# Patient Record
Sex: FEMALE | Race: WHITE | Employment: PART TIME | URBAN - NONMETROPOLITAN AREA
[De-identification: names, ages, dates, MRNs, and addresses within clinical notes are randomized per-mention and may not be internally consistent; named-entity substitution may affect disease eponyms.]

---

## 2020-01-14 RX ORDER — PANTOPRAZOLE SODIUM 40 MG/1
40 TABLET, DELAYED RELEASE ORAL DAILY PRN
COMMUNITY
End: 2020-02-13

## 2020-01-14 RX ORDER — HYDROCHLOROTHIAZIDE 12.5 MG/1
12.5 CAPSULE, GELATIN COATED ORAL DAILY
COMMUNITY

## 2020-01-14 RX ORDER — VILAZODONE HYDROCHLORIDE 10 MG/1
10 TABLET ORAL DAILY
COMMUNITY

## 2020-01-14 RX ORDER — ALBUTEROL SULFATE 90 UG/1
2 AEROSOL, METERED RESPIRATORY (INHALATION) EVERY 4 HOURS PRN
COMMUNITY

## 2020-01-14 RX ORDER — PREDNISONE 10 MG/1
10 TABLET ORAL DAILY
COMMUNITY
End: 2020-02-13

## 2020-01-14 RX ORDER — ALPRAZOLAM 0.5 MG/1
0.5 TABLET, ORALLY DISINTEGRATING ORAL 4 TIMES DAILY PRN
COMMUNITY

## 2020-02-13 ENCOUNTER — OFFICE VISIT (OUTPATIENT)
Dept: CARDIOLOGY | Age: 55
End: 2020-02-13
Payer: COMMERCIAL

## 2020-02-13 VITALS
HEIGHT: 63 IN | BODY MASS INDEX: 32.25 KG/M2 | SYSTOLIC BLOOD PRESSURE: 138 MMHG | HEART RATE: 54 BPM | DIASTOLIC BLOOD PRESSURE: 82 MMHG | WEIGHT: 182 LBS

## 2020-02-13 PROCEDURE — 0296T PR EXT ECG > 48HR TO 21 DAY RCRD W/CONECT INTL RCRD: CPT | Performed by: NURSE PRACTITIONER

## 2020-02-13 PROCEDURE — 99204 OFFICE O/P NEW MOD 45 MIN: CPT | Performed by: NURSE PRACTITIONER

## 2020-02-13 PROCEDURE — 93000 ELECTROCARDIOGRAM COMPLETE: CPT | Performed by: NURSE PRACTITIONER

## 2020-02-13 RX ORDER — PREDNISONE 10 MG/1
10 TABLET ORAL PRN
COMMUNITY

## 2020-02-13 RX ORDER — PANTOPRAZOLE SODIUM 40 MG/1
40 TABLET, DELAYED RELEASE ORAL DAILY
COMMUNITY

## 2020-02-13 RX ORDER — IBUPROFEN 200 MG
200 TABLET ORAL DAILY
COMMUNITY

## 2020-02-13 ASSESSMENT — ENCOUNTER SYMPTOMS
VOMITING: 0
BLOOD IN STOOL: 0
SHORTNESS OF BREATH: 1
FACIAL SWELLING: 0
COLOR CHANGE: 0
ABDOMINAL DISTENTION: 0
TROUBLE SWALLOWING: 0
WHEEZING: 0
STRIDOR: 0
ABDOMINAL PAIN: 0
COUGH: 0
EYE REDNESS: 0
EYE DISCHARGE: 0
NAUSEA: 0

## 2020-02-13 NOTE — PATIENT INSTRUCTIONS
valves and heart function. You may eat and take any medicines before the exam.     If you need to change your appointment, please call outpatient scheduling at [phone]. [place] at the [facility] [address] [facility] located on the first floor of [place] [address] through hospital main entrance and turn immediately to your left. Patient contact number:  [phone] (home)      Date/Arrival Time:      Stress Echocardiogram      This records the heart's activity during a cardiac stress test.  A stress echocardiogram is a very effective, noninvasive test that can help determine whether you have blockages in your coronary arteries. The exam takes approximately thirty minutes. To help ensure accurate results, patients should take the following steps in preparation for a stress echocardiogram:   Refrain from strenuous activity for 12 hours before the test.   Do not eat, drink, or smoke for two hours prior to the test.  Unless instructed otherwise by your physician, you should continue to take prescribed medications. Wear loose, comfortable clothing and walking shoes. If you need to change this appointment, please call [phone] to reschedule. 14-day monitor (ZIO Patch)      ZIO Patch  The ZIO® Patch is a new form of ambulatory cardiac monitoring described as a wearable patch. The Arti Malia is unique compared to traditional Holter monitors as the monitoring device has no leads, no wires and no batteries. The Arti Malia weighs just a few ounces that is a peel and stick device that is worn for an extended monitoring period of up to 14 days. Even though the device is worn for a longer duration than a Holter monitor, the ZIO Patch is said to be preferred by nearly 80% of patients as compared to a traditional 24 Holter monitor. Please allow 8 to 10 days for results, once you have mailed off your device.     Features of the ZIO Patch:  Arguably the smallest ambulatory cardiac monitor   Light weight   No lead wires   Single channel ECG recording   No batteries   Superior patient compliance with a water resistant   Up to 14 days of continuous ECG recording

## 2020-02-13 NOTE — PROGRESS NOTES
[address] [place] [address] Cardiology  [address]  [id number]  Phone: [phone]  Fax: [phone]    OFFICE VISIT:  2020    Quirino Chambers - : 1965    Reason For Visit:  Katina Piedra is a 48 y.o. female who is here for New Patient (elevated BP,jaw pain both sides,chest pain may be in joints)      HPI    Ms. Bethany Murphy is a 48 y.o. female with history of  hypertension, former nicotine dependence,  a family history of CAD, and abnormal Holter monitor who presents with the chief complaint of chest pain and heart \" flip-flopping. \"    Patient states around the holidays she noticed chest tightness in the center of her chest.  This occurs for minutes and spontaneously resolves. It can occur when she is awake and has also woken her up during the night. Occasionally it is associated with shortness of breath and a feeling as her heart is flip flopping. There are no aggravating factors. She does state that during this time her blood pressure was running 190/100 at home. She states that she feels first noticed her heart \"flopping\"  and wore a Holter monitor. She had accompanying dizziness. She states this occurred again in . She has started a diet since  and states she has lost some weight and has been feeling better since this time. Patient does have a history of [facility] [place] spotted fever. This was diagnosed 2018. Katina Piedra has no syncope. No numbness or weakness to suggest cerebrovascular accident or transient ischemic attack. she denies signs of bleeding. Reports no edema. She denies orthopnea. She reports paroxysmal nocturnal dyspnea approximately once a month, but states it could be her anxiety. she is sleeping on 1 pillow at night. she has been compliant with her medications. her BP has been controlled at home. PCP follows lipids and labs. EMMANUELLE Turner CNP is PCP. Quirino Chambers has the following history as recorded in [facility]:     There are no Negative for leg swelling. Gastrointestinal: Negative for abdominal distention, abdominal pain, blood in stool, nausea and vomiting. Endocrine: Negative for cold intolerance and heat intolerance. Genitourinary: Negative for dysuria and hematuria. Musculoskeletal: Negative for gait problem. Skin: Negative for color change, pallor and rash. Neurological: Positive for dizziness. Negative for syncope, facial asymmetry and light-headedness. Hematological: Does not bruise/bleed easily. Psychiatric/Behavioral: Negative for behavioral problems and confusion. All other systems reviewed and are negative. Objective  Vital Signs - /82   Pulse 54   Ht 5' 3\" (1.6 m)   Wt 182 lb (82.6 kg)   BMI 32.24 kg/m²   Physical Exam  Vitals signs and nursing note reviewed. Constitutional:       Appearance: She is well-developed. She is obese. HENT:      Head: Normocephalic and atraumatic. Right Ear: External ear normal.      Left Ear: External ear normal.      Nose: Nose normal.   Eyes:      General:         Right eye: No discharge. Left eye: No discharge. Pupils: Pupils are equal, round, and reactive to light. Neck:      Musculoskeletal: Normal range of motion. No edema. Vascular: No carotid bruit or JVD. Trachea: No tracheal deviation. Cardiovascular:      Rate and Rhythm: Regular rhythm. Bradycardia present. Heart sounds: Murmur (1/6) present. No friction rub. No gallop. Pulmonary:      Effort: Pulmonary effort is normal. No respiratory distress. Breath sounds: No wheezing, rhonchi or rales. Abdominal:      General: Bowel sounds are normal. There is no distension. Palpations: Abdomen is soft. Tenderness: There is no abdominal tenderness. Musculoskeletal: Normal range of motion. Skin:     General: Skin is warm and dry. Capillary Refill: Capillary refill takes less than 2 seconds. Findings: No rash.    Neurological:      Mental Status: She is alert and oriented to person, place, and time. Psychiatric:         Behavior: Behavior normal.         Judgment: Judgment normal.         Cardiac data:    ECG 02/13/20  Sinus bradycardia, 54 bpm    QTc 0.410 ms        Assessment, Recommendations, & Plan:  48 y.o. female with      Diagnosis Orders   1. Chest pain, unspecified type  EKG 12 lead    MD EXT ECG > 48HR TO 21 DAY RCRD W/CONECT INTL RCRD (Zio Recording)    Echo stress test    Echo 2D w doppler w color complete   2. Holter monitor, abnormal  MD EXT ECG > 48HR TO 21 DAY RCRD W/CONECT INTL RCRD (Zio Recording)    Echo stress test    Echo 2D w doppler w color complete   3. Dyspnea on exertion  MD EXT ECG > 48HR TO 21 DAY RCRD W/CONECT INTL RCRD (Zio Recording)    Echo stress test    Echo 2D w doppler w color complete       1. Chest pain-with continued episodes of chest pain and abnormal Holter readings would recommend 2D echo and echo stress test.    2.  Abnormal Holter monitor-reviewed from 2013 and 2015 appears to have several runs of nonsustained V. tach. Will order 14-day event monitor since it has been 5 years since assessed. 3.  Dyspnea on exertion-this occurs with and without chest discomfort. Will order 2D echo to assess. If 2D echo normal would recommend sleep apnea study for PND. 4.  Hypertension-patient is on HCTZ. Blood pressure today 138/82. Recommended patient keeping a blood pressure log. Will assess at follow-up appointment may need ACE/ARB.        Orders Placed This Encounter   Procedures    EKG 12 lead     Order Specific Question:   Reason for Exam?     Answer:   Chest pain    Echo stress test     Standing Status:   Future     Standing Expiration Date:   2/13/2021     Order Specific Question:   Reason for exam:     Answer:   chest pain, sob, nsvt    Echo 2D w doppler w color complete     Standing Status:   Future     Standing Expiration Date:   2/13/2021     Order Specific Question:   Reason for exam:     Answer:   cp, sob, nsvt    TX EXT ECG > 48HR TO 21 DAY RCRD W/CONECT INTL RCRD (Zio Recording)     Return in about 4 weeks (around 3/12/2020). Call with any questionsor concerns  Follow up with EMMANUELLE Ferguson - CNP for non cardiac problems  Report any new problems  Cardiovascular Fitness-Exercise as tolerated. Strive for 15 minutes of exercise most days of the week. Cardiac / HealthyDiet  Continue current medications as directed  Continue plan of treatment  It is always recommended that you bring your medicationsbottles with you to each visit - this is for your safety! Please do not hesitate to contact me for any questions or concerns. Sincerely yours,    EMMANUELLE Palumbo    This dictation was generated by voice recognition computer software. Although all attempts are made to edit dictation for accuracy, there may be errors in the transcription that are not intended.

## 2020-03-06 ENCOUNTER — HOSPITAL ENCOUNTER (OUTPATIENT)
Dept: NON INVASIVE DIAGNOSTICS | Age: 55
Discharge: HOME | End: 2020-03-06
Payer: COMMERCIAL

## 2020-03-06 LAB
LV EF: 50 %
LV EF: 58 %
LVEF MODALITY: NORMAL
LVEF MODALITY: NORMAL

## 2020-03-06 PROCEDURE — 93350 STRESS TTE ONLY: CPT

## 2020-03-06 PROCEDURE — 93306 TTE W/DOPPLER COMPLETE: CPT

## 2020-03-16 ENCOUNTER — OFFICE VISIT (OUTPATIENT)
Dept: CARDIOLOGY | Age: 55
End: 2020-03-16
Payer: COMMERCIAL

## 2020-03-16 VITALS
OXYGEN SATURATION: 99 % | BODY MASS INDEX: 30.56 KG/M2 | DIASTOLIC BLOOD PRESSURE: 74 MMHG | SYSTOLIC BLOOD PRESSURE: 140 MMHG | HEIGHT: 64 IN | HEART RATE: 62 BPM | WEIGHT: 179 LBS

## 2020-03-16 PROCEDURE — 99214 OFFICE O/P EST MOD 30 MIN: CPT | Performed by: NURSE PRACTITIONER

## 2020-03-16 RX ORDER — LOSARTAN POTASSIUM 25 MG/1
25 TABLET ORAL DAILY
Qty: 90 TABLET | Refills: 1 | Status: SHIPPED | OUTPATIENT
Start: 2020-03-16

## 2020-03-16 ASSESSMENT — ENCOUNTER SYMPTOMS
SHORTNESS OF BREATH: 1
COUGH: 0
STRIDOR: 0
ABDOMINAL PAIN: 0
COLOR CHANGE: 0
BLOOD IN STOOL: 0
VOMITING: 0
ABDOMINAL DISTENTION: 0
EYE REDNESS: 0
TROUBLE SWALLOWING: 0
FACIAL SWELLING: 0
WHEEZING: 0
NAUSEA: 0
EYE DISCHARGE: 0

## 2020-03-16 NOTE — PROGRESS NOTES
Used   Substance Use Topics    Alcohol use: Yes     Comment: occassional      Current Outpatient Medications   Medication Sig Dispense Refill    losartan (COZAAR) 25 MG tablet Take 1 tablet by mouth daily 90 tablet 1    pantoprazole (PROTONIX) 40 MG tablet Take 40 mg by mouth daily      predniSONE (DELTASONE) 10 MG tablet Take 10 mg by mouth as needed      aspirin 81 MG tablet Take 81 mg by mouth every other day      ibuprofen (ADVIL;MOTRIN) 200 MG tablet Take 200 mg by mouth daily      ALPRAZolam (NIRAVAM) 0.5 MG dissolvable tablet Take 0.5 mg by mouth 4 times daily as needed for Anxiety.  albuterol sulfate HFA (PROAIR HFA) 108 (90 Base) MCG/ACT inhaler Inhale 2 puffs into the lungs every 4 hours as needed for Wheezing      hydrochlorothiazide (MICROZIDE) 12.5 MG capsule Take 12.5 mg by mouth daily      vilazodone HCl (VILAZODONE HCL) 10 MG TABS Take 10 mg by mouth daily       No current facility-administered medications for this visit. Allergies: Erythromycin    Review of Systems  Review of Systems   Constitutional: Negative for activity change, diaphoresis, fatigue, fever and unexpected weight change. HENT: Negative for facial swelling, nosebleeds and trouble swallowing. Eyes: Negative for discharge, redness and visual disturbance. Respiratory: Positive for shortness of breath. Negative for cough, wheezing and stridor. Cardiovascular: Positive for palpitations. Negative for chest pain and leg swelling. Gastrointestinal: Negative for abdominal distention, abdominal pain, blood in stool, nausea and vomiting. Endocrine: Negative for cold intolerance and heat intolerance. Genitourinary: Negative for dysuria and hematuria. Musculoskeletal: Negative for gait problem. Skin: Negative for color change, pallor and rash. Neurological: Negative for dizziness, syncope, facial asymmetry and light-headedness. Hematological: Does not bruise/bleed easily.    Psychiatric/Behavioral: 2. SA node dysfunction ([address]     3. Dyspnea on exertion         1. Hypertension- patient is on HCTZ. Blood pressure today 140/74. Blood pressure log with blood pressures 120s to 150s. Will start patient on losartan 25 mg daily. Discussed with patient improving diet and exercise to help reduce blood pressure. Patient will continue to keep blood pressure log. 2.  SA node dysfunction- 3 runs of SVT on ZIO report. Rare PAC and/or PVC. Patient with lowest heart rate 39 recorded while sleeping. Hesitant to add beta-blocker due to this. Continue to monitor. 3.  Dyspnea on exertion-stress echo and 2D echo unremarkable. Will defer to PCP for further work-up at this time. No orders of the defined types were placed in this encounter. Return in about 6 months (around 9/16/2020). Call with any questionsor concerns  Follow up with EMMANUELLE Rincon - CNP for non cardiac problems  Report any new problems  Cardiovascular Fitness-Exercise as tolerated. Strive for 15 minutes of exercise most days of the week. Cardiac / HealthyDiet  Continue current medications as directed  Continue plan of treatment  It is always recommended that you bring your medicationsbottles with you to each visit - this is for your safety! Please do not hesitate to contact me for any questions or concerns. Sincerely yours,    EMMANUELLE Olivarez    This dictation was generated by voice recognition computer software. Although all attempts are made to edit dictation for accuracy, there may be errors in the transcription that are not intended.

## 2020-03-16 NOTE — PATIENT INSTRUCTIONS
No orders of the defined types were placed in this encounter. Return in about 6 months (around 9/16/2020). Call with any questionsor concerns  Follow up with EMMANUELLE Wick CNP for non cardiac problems  Report any new problems  Cardiovascular Fitness-Exercise as tolerated. Strive for 15 minutes of exercise most days of the week. Cardiac / HealthyDiet  Continue current medications as directed  Continue plan of treatment  It is always recommended that you bring your medicationsbottles with you to each visit - this is for your safety! Hypertension  (High Blood Pressure)  Most people with high blood pressure (hypertension) have no symptoms. High blood pressure can be a dangerous problem. Hypertension is dangerous because you may have it and not know it. High blood pressure may mean that your heart needs to work harder to pump blood. Your blood pressure is measured with 2 numbers. The first number is when your heart flexes (contracts), and the second number is when your heart relaxes. The higher the numbers are, the more you are at risk for problems. Write down your blood pressure today. The best blood pressure for adults is 120/80 (mmHg) or lower. It is likely that your blood pressure was recorded at least 2 times today. It is important for you to give these numbers to your doctor. If you do not have a doctor, try to get follow-up care at a hospital or community clinic. You may need to start high blood pressure medicine. You may also need to adjust your medicines as told by your doctor. Even mild high blood pressure increases long-term health risks. One high reading does not mean you have hypertension. · Your blood pressure should be taken when you are relaxed. It is also important to sit for about 10 minutes before being tested. · Things that can increase your blood pressure are:  Injury, Illness, Stress, Caffeine, and some medicines (like decongestants).   · High blood pressure does not